# Patient Record
Sex: MALE | Race: WHITE | NOT HISPANIC OR LATINO | URBAN - METROPOLITAN AREA
[De-identification: names, ages, dates, MRNs, and addresses within clinical notes are randomized per-mention and may not be internally consistent; named-entity substitution may affect disease eponyms.]

---

## 2017-11-16 ENCOUNTER — INPATIENT (INPATIENT)
Facility: HOSPITAL | Age: 27
LOS: 2 days | Discharge: HOME | DRG: 340 | End: 2017-11-19
Attending: SURGERY | Admitting: SURGERY
Payer: COMMERCIAL

## 2017-11-16 VITALS
SYSTOLIC BLOOD PRESSURE: 117 MMHG | DIASTOLIC BLOOD PRESSURE: 41 MMHG | OXYGEN SATURATION: 96 % | HEART RATE: 53 BPM | TEMPERATURE: 97 F | RESPIRATION RATE: 18 BRPM

## 2017-11-16 LAB
ALBUMIN SERPL ELPH-MCNC: 4.3 G/DL — SIGNIFICANT CHANGE UP (ref 3.4–5)
ALP SERPL-CCNC: 56 U/L — SIGNIFICANT CHANGE UP (ref 40–120)
ALT FLD-CCNC: 25 U/L — SIGNIFICANT CHANGE UP (ref 12–42)
ANION GAP SERPL CALC-SCNC: 6 MMOL/L — LOW (ref 9–16)
APPEARANCE UR: CLEAR — SIGNIFICANT CHANGE UP
APTT BLD: 29.5 SEC — SIGNIFICANT CHANGE UP (ref 27.5–36.5)
AST SERPL-CCNC: 17 U/L — SIGNIFICANT CHANGE UP (ref 15–37)
BASOPHILS NFR BLD AUTO: 0.4 % — SIGNIFICANT CHANGE UP (ref 0–2)
BILIRUB SERPL-MCNC: 0.8 MG/DL — SIGNIFICANT CHANGE UP (ref 0.2–1.2)
BILIRUB UR-MCNC: NEGATIVE — SIGNIFICANT CHANGE UP
BUN SERPL-MCNC: 14 MG/DL — SIGNIFICANT CHANGE UP (ref 7–23)
CALCIUM SERPL-MCNC: 9.8 MG/DL — SIGNIFICANT CHANGE UP (ref 8.5–10.5)
CHLORIDE SERPL-SCNC: 102 MMOL/L — SIGNIFICANT CHANGE UP (ref 96–108)
CO2 SERPL-SCNC: 32 MMOL/L — HIGH (ref 22–31)
COLOR SPEC: YELLOW — SIGNIFICANT CHANGE UP
CREAT SERPL-MCNC: 1.13 MG/DL — SIGNIFICANT CHANGE UP (ref 0.5–1.3)
DIFF PNL FLD: NEGATIVE — SIGNIFICANT CHANGE UP
EOSINOPHIL NFR BLD AUTO: 1 % — SIGNIFICANT CHANGE UP (ref 0–6)
GLUCOSE SERPL-MCNC: 117 MG/DL — HIGH (ref 70–99)
GLUCOSE UR QL: NEGATIVE — SIGNIFICANT CHANGE UP
HCT VFR BLD CALC: 44.1 % — SIGNIFICANT CHANGE UP (ref 39–50)
HGB BLD-MCNC: 14.7 G/DL — SIGNIFICANT CHANGE UP (ref 13–17)
IMM GRANULOCYTES NFR BLD AUTO: 0.4 % — SIGNIFICANT CHANGE UP (ref 0–1.5)
INR BLD: 1.06 — SIGNIFICANT CHANGE UP (ref 0.88–1.16)
KETONES UR-MCNC: NEGATIVE — SIGNIFICANT CHANGE UP
LACTATE SERPL-SCNC: 1.7 MMOL/L — SIGNIFICANT CHANGE UP (ref 0.4–2)
LEUKOCYTE ESTERASE UR-ACNC: NEGATIVE — SIGNIFICANT CHANGE UP
LIDOCAIN IGE QN: 140 U/L — SIGNIFICANT CHANGE UP (ref 73–393)
LYMPHOCYTES # BLD AUTO: 14.3 % — SIGNIFICANT CHANGE UP (ref 13–44)
MAGNESIUM SERPL-MCNC: 1.6 MG/DL — SIGNIFICANT CHANGE UP (ref 1.6–2.6)
MCHC RBC-ENTMCNC: 30.7 PG — SIGNIFICANT CHANGE UP (ref 27–34)
MCHC RBC-ENTMCNC: 33.3 G/DL — SIGNIFICANT CHANGE UP (ref 32–36)
MCV RBC AUTO: 92.1 FL — SIGNIFICANT CHANGE UP (ref 80–100)
MONOCYTES NFR BLD AUTO: 8.9 % — SIGNIFICANT CHANGE UP (ref 2–14)
NEUTROPHILS NFR BLD AUTO: 75 % — SIGNIFICANT CHANGE UP (ref 43–77)
NITRITE UR-MCNC: NEGATIVE — SIGNIFICANT CHANGE UP
PH UR: 7 — SIGNIFICANT CHANGE UP (ref 5–8)
PLATELET # BLD AUTO: 205 K/UL — SIGNIFICANT CHANGE UP (ref 150–400)
POTASSIUM SERPL-MCNC: 4.1 MMOL/L — SIGNIFICANT CHANGE UP (ref 3.5–5.3)
POTASSIUM SERPL-SCNC: 4.1 MMOL/L — SIGNIFICANT CHANGE UP (ref 3.5–5.3)
PROT SERPL-MCNC: 7.3 G/DL — SIGNIFICANT CHANGE UP (ref 6.4–8.2)
PROT UR-MCNC: NEGATIVE MG/DL — SIGNIFICANT CHANGE UP
PROTHROM AB SERPL-ACNC: 11.7 SEC — SIGNIFICANT CHANGE UP (ref 9.8–12.7)
RBC # BLD: 4.79 M/UL — SIGNIFICANT CHANGE UP (ref 4.2–5.8)
RBC # FLD: 12.7 % — SIGNIFICANT CHANGE UP (ref 10.3–16.9)
SODIUM SERPL-SCNC: 140 MMOL/L — SIGNIFICANT CHANGE UP (ref 132–145)
SP GR SPEC: <=1.005 — SIGNIFICANT CHANGE UP (ref 1–1.03)
UROBILINOGEN FLD QL: 0.2 E.U./DL — SIGNIFICANT CHANGE UP
WBC # BLD: 11.4 K/UL — HIGH (ref 3.8–10.5)
WBC # FLD AUTO: 11.4 K/UL — HIGH (ref 3.8–10.5)

## 2017-11-16 PROCEDURE — 99285 EMERGENCY DEPT VISIT HI MDM: CPT

## 2017-11-16 PROCEDURE — 74177 CT ABD & PELVIS W/CONTRAST: CPT | Mod: 26

## 2017-11-16 RX ORDER — ONDANSETRON 8 MG/1
4 TABLET, FILM COATED ORAL ONCE
Qty: 0 | Refills: 0 | Status: COMPLETED | OUTPATIENT
Start: 2017-11-16 | End: 2017-11-16

## 2017-11-16 RX ORDER — ONDANSETRON 8 MG/1
4 TABLET, FILM COATED ORAL EVERY 6 HOURS
Qty: 0 | Refills: 0 | Status: DISCONTINUED | OUTPATIENT
Start: 2017-11-16 | End: 2017-11-19

## 2017-11-16 RX ORDER — SODIUM CHLORIDE 9 MG/ML
1000 INJECTION INTRAMUSCULAR; INTRAVENOUS; SUBCUTANEOUS ONCE
Qty: 0 | Refills: 0 | Status: COMPLETED | OUTPATIENT
Start: 2017-11-16 | End: 2017-11-16

## 2017-11-16 RX ORDER — SODIUM CHLORIDE 9 MG/ML
1000 INJECTION, SOLUTION INTRAVENOUS
Qty: 0 | Refills: 0 | Status: DISCONTINUED | OUTPATIENT
Start: 2017-11-16 | End: 2017-11-19

## 2017-11-16 RX ORDER — HYDROMORPHONE HYDROCHLORIDE 2 MG/ML
0.5 INJECTION INTRAMUSCULAR; INTRAVENOUS; SUBCUTANEOUS EVERY 4 HOURS
Qty: 0 | Refills: 0 | Status: DISCONTINUED | OUTPATIENT
Start: 2017-11-16 | End: 2017-11-19

## 2017-11-16 RX ORDER — HEPARIN SODIUM 5000 [USP'U]/ML
5000 INJECTION INTRAVENOUS; SUBCUTANEOUS EVERY 8 HOURS
Qty: 0 | Refills: 0 | Status: DISCONTINUED | OUTPATIENT
Start: 2017-11-16 | End: 2017-11-17

## 2017-11-16 RX ORDER — HYDROMORPHONE HYDROCHLORIDE 2 MG/ML
1 INJECTION INTRAMUSCULAR; INTRAVENOUS; SUBCUTANEOUS EVERY 4 HOURS
Qty: 0 | Refills: 0 | Status: DISCONTINUED | OUTPATIENT
Start: 2017-11-16 | End: 2017-11-19

## 2017-11-16 RX ORDER — IOHEXOL 300 MG/ML
50 INJECTION, SOLUTION INTRAVENOUS ONCE
Qty: 0 | Refills: 0 | Status: COMPLETED | OUTPATIENT
Start: 2017-11-16 | End: 2017-11-16

## 2017-11-16 RX ORDER — INFLUENZA VIRUS VACCINE 15; 15; 15; 15 UG/.5ML; UG/.5ML; UG/.5ML; UG/.5ML
0.5 SUSPENSION INTRAMUSCULAR ONCE
Qty: 0 | Refills: 0 | Status: DISCONTINUED | OUTPATIENT
Start: 2017-11-16 | End: 2017-11-19

## 2017-11-16 RX ORDER — PIPERACILLIN AND TAZOBACTAM 4; .5 G/20ML; G/20ML
3.38 INJECTION, POWDER, LYOPHILIZED, FOR SOLUTION INTRAVENOUS ONCE
Qty: 0 | Refills: 0 | Status: COMPLETED | OUTPATIENT
Start: 2017-11-16 | End: 2017-11-16

## 2017-11-16 RX ORDER — PIPERACILLIN AND TAZOBACTAM 4; .5 G/20ML; G/20ML
3.38 INJECTION, POWDER, LYOPHILIZED, FOR SOLUTION INTRAVENOUS EVERY 6 HOURS
Qty: 0 | Refills: 0 | Status: DISCONTINUED | OUTPATIENT
Start: 2017-11-16 | End: 2017-11-19

## 2017-11-16 RX ADMIN — IOHEXOL 50 MILLILITER(S): 300 INJECTION, SOLUTION INTRAVENOUS at 12:17

## 2017-11-16 RX ADMIN — PIPERACILLIN AND TAZOBACTAM 200 GRAM(S): 4; .5 INJECTION, POWDER, LYOPHILIZED, FOR SOLUTION INTRAVENOUS at 15:41

## 2017-11-16 RX ADMIN — ONDANSETRON 4 MILLIGRAM(S): 8 TABLET, FILM COATED ORAL at 12:17

## 2017-11-16 RX ADMIN — SODIUM CHLORIDE 1000 MILLILITER(S): 9 INJECTION INTRAMUSCULAR; INTRAVENOUS; SUBCUTANEOUS at 12:18

## 2017-11-16 RX ADMIN — HEPARIN SODIUM 5000 UNIT(S): 5000 INJECTION INTRAVENOUS; SUBCUTANEOUS at 21:01

## 2017-11-16 RX ADMIN — SODIUM CHLORIDE 1000 MILLILITER(S): 9 INJECTION INTRAMUSCULAR; INTRAVENOUS; SUBCUTANEOUS at 16:56

## 2017-11-16 NOTE — H&P ADULT - NSHPREVIEWOFSYSTEMS_GEN_ALL_CORE
REVIEW OF SYSTEMS:    CONSTITUTIONAL: No weakness, fevers or chills  EYES/ENT: No visual changes;  No vertigo or throat pain   NECK: No pain or stiffness  RESPIRATORY: No cough, wheezing, hemoptysis; No shortness of breath  CARDIOVASCULAR: No chest pain or palpitations  GASTROINTESTINAL: RLQ pain this AM. reported bouts of nausea, No vomiting, or hematemesis; No diarrhea or constipation. No melena or hematochezia.  GENITOURINARY: No dysuria, frequency or hematuria  NEUROLOGICAL: No numbness or weakness  SKIN: No itching, rashes

## 2017-11-16 NOTE — PRE-OP CHECKLIST - SELECT TESTS ORDERED
INR/CBC/BMP/PT/PTT/Hepatic Function/UCG/Urinalysis/CMP INR/Urinalysis/BMP/PT/PTT/Hepatic Function/CBC/CMP/Type and Cross/UCG/Type and Screen

## 2017-11-16 NOTE — H&P ADULT - HISTORY OF PRESENT ILLNESS
28 yo M otherwise healthy, began experiencing sudden sharp pain in his RLQ around 4 AM, fell back asleep, awoke around 7 AM with increased pain, and nausea. Was trying to get to his PCP but it took too long, he was walking down [address] when he felt he might pass out so he walked into [facility]. No vomitting, no fevers or chills, no change in BM for past few days.

## 2017-11-16 NOTE — H&P ADULT - ASSESSMENT
28 yo M with acute appendicitis    Add on for OR  NPO/IVF  Zosyn  Pain nausea control  DVT PPX  AM labs

## 2017-11-16 NOTE — ED PROVIDER NOTE - GASTROINTESTINAL, MLM
Abdomen soft,  no guarding. RLQ and RUQ tender to palpation Abdomen soft,  no guarding. RLQ tenderness to palpation

## 2017-11-16 NOTE — ED PROVIDER NOTE - PROGRESS NOTE DETAILS
The scribe's documentation has been prepared under my direction and personally reviewed by me in its entirety. I confirm that the note above accurately reflects all work, treatment, procedures, and medical decision making performed by me. acute appendicitis on CT, wbc 11, spoke with dr. ahuja ACS, accepted patient to regional bed, IV Zosyn, fluids ordered

## 2017-11-17 LAB
ANION GAP SERPL CALC-SCNC: 12 MMOL/L — SIGNIFICANT CHANGE UP (ref 5–17)
BASOPHILS NFR BLD AUTO: 0.3 % — SIGNIFICANT CHANGE UP (ref 0–2)
BUN SERPL-MCNC: 8 MG/DL — SIGNIFICANT CHANGE UP (ref 7–23)
CALCIUM SERPL-MCNC: 9.2 MG/DL — SIGNIFICANT CHANGE UP (ref 8.4–10.5)
CHLORIDE SERPL-SCNC: 98 MMOL/L — SIGNIFICANT CHANGE UP (ref 96–108)
CO2 SERPL-SCNC: 27 MMOL/L — SIGNIFICANT CHANGE UP (ref 22–31)
CREAT SERPL-MCNC: 1.13 MG/DL — SIGNIFICANT CHANGE UP (ref 0.5–1.3)
EOSINOPHIL NFR BLD AUTO: 1.8 % — SIGNIFICANT CHANGE UP (ref 0–6)
GLUCOSE SERPL-MCNC: 98 MG/DL — SIGNIFICANT CHANGE UP (ref 70–99)
HCT VFR BLD CALC: 40.9 % — SIGNIFICANT CHANGE UP (ref 39–50)
HGB BLD-MCNC: 13.8 G/DL — SIGNIFICANT CHANGE UP (ref 13–17)
LYMPHOCYTES # BLD AUTO: 28.1 % — SIGNIFICANT CHANGE UP (ref 13–44)
MCHC RBC-ENTMCNC: 31.4 PG — SIGNIFICANT CHANGE UP (ref 27–34)
MCHC RBC-ENTMCNC: 33.7 G/DL — SIGNIFICANT CHANGE UP (ref 32–36)
MCV RBC AUTO: 93 FL — SIGNIFICANT CHANGE UP (ref 80–100)
MONOCYTES NFR BLD AUTO: 12.2 % — SIGNIFICANT CHANGE UP (ref 2–14)
NEUTROPHILS NFR BLD AUTO: 57.6 % — SIGNIFICANT CHANGE UP (ref 43–77)
PLATELET # BLD AUTO: 194 K/UL — SIGNIFICANT CHANGE UP (ref 150–400)
POTASSIUM SERPL-MCNC: 4 MMOL/L — SIGNIFICANT CHANGE UP (ref 3.5–5.3)
POTASSIUM SERPL-SCNC: 4 MMOL/L — SIGNIFICANT CHANGE UP (ref 3.5–5.3)
RBC # BLD: 4.4 M/UL — SIGNIFICANT CHANGE UP (ref 4.2–5.8)
RBC # FLD: 13.3 % — SIGNIFICANT CHANGE UP (ref 10.3–16.9)
SODIUM SERPL-SCNC: 137 MMOL/L — SIGNIFICANT CHANGE UP (ref 135–145)
WBC # BLD: 7.7 K/UL — SIGNIFICANT CHANGE UP (ref 3.8–10.5)
WBC # FLD AUTO: 7.7 K/UL — SIGNIFICANT CHANGE UP (ref 3.8–10.5)

## 2017-11-17 RX ORDER — ACETAMINOPHEN 500 MG
1000 TABLET ORAL ONCE
Qty: 0 | Refills: 0 | Status: COMPLETED | OUTPATIENT
Start: 2017-11-17 | End: 2017-11-17

## 2017-11-17 RX ORDER — ACETAMINOPHEN 500 MG
750 TABLET ORAL ONCE
Qty: 0 | Refills: 0 | Status: DISCONTINUED | OUTPATIENT
Start: 2017-11-17 | End: 2017-11-17

## 2017-11-17 RX ORDER — ACETAMINOPHEN 500 MG
650 TABLET ORAL ONCE
Qty: 0 | Refills: 0 | Status: DISCONTINUED | OUTPATIENT
Start: 2017-11-17 | End: 2017-11-17

## 2017-11-17 RX ADMIN — PIPERACILLIN AND TAZOBACTAM 200 GRAM(S): 4; .5 INJECTION, POWDER, LYOPHILIZED, FOR SOLUTION INTRAVENOUS at 20:49

## 2017-11-17 RX ADMIN — HEPARIN SODIUM 5000 UNIT(S): 5000 INJECTION INTRAVENOUS; SUBCUTANEOUS at 06:34

## 2017-11-17 RX ADMIN — SODIUM CHLORIDE 125 MILLILITER(S): 9 INJECTION, SOLUTION INTRAVENOUS at 13:13

## 2017-11-17 RX ADMIN — Medication 400 MILLIGRAM(S): at 14:11

## 2017-11-17 RX ADMIN — SODIUM CHLORIDE 125 MILLILITER(S): 9 INJECTION, SOLUTION INTRAVENOUS at 06:30

## 2017-11-17 RX ADMIN — HEPARIN SODIUM 5000 UNIT(S): 5000 INJECTION INTRAVENOUS; SUBCUTANEOUS at 13:12

## 2017-11-17 RX ADMIN — HYDROMORPHONE HYDROCHLORIDE 0.5 MILLIGRAM(S): 2 INJECTION INTRAMUSCULAR; INTRAVENOUS; SUBCUTANEOUS at 17:30

## 2017-11-17 RX ADMIN — SODIUM CHLORIDE 125 MILLILITER(S): 9 INJECTION, SOLUTION INTRAVENOUS at 20:49

## 2017-11-17 RX ADMIN — PIPERACILLIN AND TAZOBACTAM 200 GRAM(S): 4; .5 INJECTION, POWDER, LYOPHILIZED, FOR SOLUTION INTRAVENOUS at 13:12

## 2017-11-17 RX ADMIN — HYDROMORPHONE HYDROCHLORIDE 0.5 MILLIGRAM(S): 2 INJECTION INTRAMUSCULAR; INTRAVENOUS; SUBCUTANEOUS at 17:28

## 2017-11-17 RX ADMIN — PIPERACILLIN AND TAZOBACTAM 200 GRAM(S): 4; .5 INJECTION, POWDER, LYOPHILIZED, FOR SOLUTION INTRAVENOUS at 01:23

## 2017-11-17 RX ADMIN — PIPERACILLIN AND TAZOBACTAM 200 GRAM(S): 4; .5 INJECTION, POWDER, LYOPHILIZED, FOR SOLUTION INTRAVENOUS at 06:30

## 2017-11-17 NOTE — PROGRESS NOTE ADULT - SUBJECTIVE AND OBJECTIVE BOX
ON: Admitted for appendicitis. Added on for OR. INTERVAL HPI/OVERNIGHT EVENTS: Pt seen and examined at bedside this morning by ACS team. Pt reporting adequate pain control.  Discussed plan for OR today for a laparoscopic appendectomy.      MEDICATIONS  (STANDING):  heparin  Injectable 5000 Unit(s) SubCutaneous every 8 hours  influenza   Vaccine 0.5 milliLiter(s) IntraMuscular once  lactated ringers. 1000 milliLiter(s) (125 mL/Hr) IV Continuous <Continuous>  piperacillin/tazobactam IVPB. 3.375 Gram(s) IV Intermittent every 6 hours    MEDICATIONS  (PRN):  HYDROmorphone  Injectable 0.5 milliGRAM(s) IV Push every 4 hours PRN Moderate Pain  HYDROmorphone  Injectable 1 milliGRAM(s) IV Push every 4 hours PRN Severe Pain  ondansetron Injectable 4 milliGRAM(s) IV Push every 6 hours PRN Nausea      Vital Signs Last 24 Hrs  T(C): 36.4 (17 Nov 2017 13:42), Max: 37 (16 Nov 2017 16:14)  T(F): 97.6 (17 Nov 2017 13:42), Max: 98.6 (16 Nov 2017 16:14)  HR: 51 (17 Nov 2017 13:42) (51 - 67)  BP: 101/66 (17 Nov 2017 13:42) (100/65 - 134/59)  BP(mean): --  RR: 16 (17 Nov 2017 13:42) (16 - 17)  SpO2: 97% (17 Nov 2017 13:42) (97% - 100%)    PHYSICAL EXAM:      Constitutional: Age appropriate male in NAD  Respiratory: non labored breathing, no respiratory distress  Cardiovascular: RRR  Gastrointestinal: Soft, tender to palpation primarily in the RLQ, without rebound/guarding/rigidity, nondistended  Extremities: WWP with no peripheral edema                  I&O's Detail    16 Nov 2017 07:01  -  17 Nov 2017 07:00  --------------------------------------------------------  IN:    IV PiggyBack: 200 mL    lactated ringers.: 1500 mL  Total IN: 1700 mL    OUT:  Total OUT: 0 mL    Total NET: 1700 mL      17 Nov 2017 07:01  -  17 Nov 2017 15:20  --------------------------------------------------------  IN:  Total IN: 0 mL    OUT:    Voided: 800 mL  Total OUT: 800 mL    Total NET: -800 mL          LABS:                        13.8   7.7   )-----------( 194      ( 17 Nov 2017 06:59 )             40.9     11-17    137  |  98  |  8   ----------------------------<  98  4.0   |  27  |  1.13    Ca    9.2      17 Nov 2017 06:59  Mg     1.6     11-16    TPro  7.3  /  Alb  4.3  /  TBili  0.8  /  DBili  x   /  AST  17  /  ALT  25  /  AlkPhos  56  11-16    PT/INR - ( 16 Nov 2017 16:47 )   PT: 11.7 sec;   INR: 1.06          PTT - ( 16 Nov 2017 16:47 )  PTT:29.5 sec  Urinalysis Basic - ( 16 Nov 2017 15:39 )    Color: Yellow / Appearance: Clear / SG: <=1.005 / pH: x  Gluc: x / Ketone: NEGATIVE  / Bili: NEGATIVE / Urobili: 0.2 E.U./dL   Blood: x / Protein: NEGATIVE mg/dL / Nitrite: NEGATIVE   Leuk Esterase: NEGATIVE / RBC: x / WBC x   Sq Epi: x / Non Sq Epi: x / Bacteria: x

## 2017-11-17 NOTE — PROGRESS NOTE ADULT - ASSESSMENT
28 y/o M with acute appendicitis now s/p laparoscopic appendectomy    -Activity as tolerated  -NPO/IVF   -IV antibiotics  -pain/nausea control  -DVT PPx/SCDs  -am labs

## 2017-11-17 NOTE — PROGRESS NOTE ADULT - ASSESSMENT
28 yo M with acute appendicitis    Add on for OR  NPO/IVF  Zosyn  Pain nausea control  DVT PPX  AM labs 26 yo M with acute appendicitis    Added on for laparoscopic appendectomy today  NPO/IVF  Zosyn  Pain nausea control  DVT PPX, Encourage ambulation  AM labs

## 2017-11-17 NOTE — PROGRESS NOTE ADULT - SUBJECTIVE AND OBJECTIVE BOX
Procedure: Laparoscopic Appendectoy  Surgeon: Sherri    S: Pt c/o mild pain at incisions Denies CP, SOB, ACEVEDO, calf tenderness. Pain controlled with medication/ice packs    O:  T(C): 36.8 (11-17-17 @ 18:49), Max: 36.8 (11-17-17 @ 18:49)  T(F): 98.2 (11-17-17 @ 18:49), Max: 98.2 (11-17-17 @ 18:49)  HR: 66 (11-17-17 @ 18:49) (66 - 87)  BP: 112/51 (11-17-17 @ 18:49) (112/51 - 151/62)  RR: 18 (11-17-17 @ 18:49) (10 - 23)  SpO2: 100% (11-17-17 @ 18:49) (99% - 100%)  Wt(kg): --                        13.8   7.7   )-----------( 194      ( 17 Nov 2017 06:59 )             40.9     11-17    137  |  98  |  8   ----------------------------<  98  4.0   |  27  |  1.13    Ca    9.2      17 Nov 2017 06:59  Mg     1.6     11-16    TPro  7.3  /  Alb  4.3  /  TBili  0.8  /  DBili  x   /  AST  17  /  ALT  25  /  AlkPhos  56  11-16      Gen: NAD, resting comfortably in bed  C/V: NSR  Pulm: Nonlabored breathing, no respiratory distress  Abd: soft, NT/ND Incision: CDI  Extrem: WWP, no calf edema, SCDs in place      A/P: 27yMale s/p above procedure  Diet:  IVF:  Pain/nausea control  DVT ppx:  Dispo plan:

## 2017-11-17 NOTE — BRIEF OPERATIVE NOTE - PROCEDURE
<<-----Click on this checkbox to enter Procedure Laparoscopic appendectomy  11/17/2017    Active  [place]

## 2017-11-18 LAB
ANION GAP SERPL CALC-SCNC: 13 MMOL/L — SIGNIFICANT CHANGE UP (ref 5–17)
BUN SERPL-MCNC: 7 MG/DL — SIGNIFICANT CHANGE UP (ref 7–23)
CALCIUM SERPL-MCNC: 9.8 MG/DL — SIGNIFICANT CHANGE UP (ref 8.4–10.5)
CHLORIDE SERPL-SCNC: 98 MMOL/L — SIGNIFICANT CHANGE UP (ref 96–108)
CO2 SERPL-SCNC: 26 MMOL/L — SIGNIFICANT CHANGE UP (ref 22–31)
CREAT SERPL-MCNC: 0.99 MG/DL — SIGNIFICANT CHANGE UP (ref 0.5–1.3)
GLUCOSE SERPL-MCNC: 113 MG/DL — HIGH (ref 70–99)
HCT VFR BLD CALC: 42.7 % — SIGNIFICANT CHANGE UP (ref 39–50)
HGB BLD-MCNC: 14.1 G/DL — SIGNIFICANT CHANGE UP (ref 13–17)
MAGNESIUM SERPL-MCNC: 2 MG/DL — SIGNIFICANT CHANGE UP (ref 1.6–2.6)
MCHC RBC-ENTMCNC: 30.4 PG — SIGNIFICANT CHANGE UP (ref 27–34)
MCHC RBC-ENTMCNC: 33 G/DL — SIGNIFICANT CHANGE UP (ref 32–36)
MCV RBC AUTO: 92 FL — SIGNIFICANT CHANGE UP (ref 80–100)
PHOSPHATE SERPL-MCNC: 4.5 MG/DL — SIGNIFICANT CHANGE UP (ref 2.5–4.5)
PLATELET # BLD AUTO: 220 K/UL — SIGNIFICANT CHANGE UP (ref 150–400)
POTASSIUM SERPL-MCNC: 4 MMOL/L — SIGNIFICANT CHANGE UP (ref 3.5–5.3)
POTASSIUM SERPL-SCNC: 4 MMOL/L — SIGNIFICANT CHANGE UP (ref 3.5–5.3)
RBC # BLD: 4.64 M/UL — SIGNIFICANT CHANGE UP (ref 4.2–5.8)
RBC # FLD: 12.7 % — SIGNIFICANT CHANGE UP (ref 10.3–16.9)
SODIUM SERPL-SCNC: 137 MMOL/L — SIGNIFICANT CHANGE UP (ref 135–145)
WBC # BLD: 10 K/UL — SIGNIFICANT CHANGE UP (ref 3.8–10.5)
WBC # FLD AUTO: 10 K/UL — SIGNIFICANT CHANGE UP (ref 3.8–10.5)

## 2017-11-18 RX ADMIN — SODIUM CHLORIDE 125 MILLILITER(S): 9 INJECTION, SOLUTION INTRAVENOUS at 02:53

## 2017-11-18 RX ADMIN — HYDROMORPHONE HYDROCHLORIDE 0.5 MILLIGRAM(S): 2 INJECTION INTRAMUSCULAR; INTRAVENOUS; SUBCUTANEOUS at 06:25

## 2017-11-18 RX ADMIN — HYDROMORPHONE HYDROCHLORIDE 0.5 MILLIGRAM(S): 2 INJECTION INTRAMUSCULAR; INTRAVENOUS; SUBCUTANEOUS at 06:45

## 2017-11-18 RX ADMIN — HYDROMORPHONE HYDROCHLORIDE 0.5 MILLIGRAM(S): 2 INJECTION INTRAMUSCULAR; INTRAVENOUS; SUBCUTANEOUS at 17:18

## 2017-11-18 RX ADMIN — PIPERACILLIN AND TAZOBACTAM 200 GRAM(S): 4; .5 INJECTION, POWDER, LYOPHILIZED, FOR SOLUTION INTRAVENOUS at 08:43

## 2017-11-18 RX ADMIN — HYDROMORPHONE HYDROCHLORIDE 0.5 MILLIGRAM(S): 2 INJECTION INTRAMUSCULAR; INTRAVENOUS; SUBCUTANEOUS at 17:35

## 2017-11-18 RX ADMIN — PIPERACILLIN AND TAZOBACTAM 200 GRAM(S): 4; .5 INJECTION, POWDER, LYOPHILIZED, FOR SOLUTION INTRAVENOUS at 14:48

## 2017-11-18 RX ADMIN — PIPERACILLIN AND TAZOBACTAM 200 GRAM(S): 4; .5 INJECTION, POWDER, LYOPHILIZED, FOR SOLUTION INTRAVENOUS at 02:53

## 2017-11-18 RX ADMIN — PIPERACILLIN AND TAZOBACTAM 200 GRAM(S): 4; .5 INJECTION, POWDER, LYOPHILIZED, FOR SOLUTION INTRAVENOUS at 20:45

## 2017-11-18 RX ADMIN — SODIUM CHLORIDE 125 MILLILITER(S): 9 INJECTION, SOLUTION INTRAVENOUS at 20:45

## 2017-11-18 NOTE — PROGRESS NOTE ADULT - SUBJECTIVE AND OBJECTIVE BOX
INTERVAL HPI/OVERNIGHT EVENTS:   SURGERY ATTENDING    STATUS POST:  LAPAROSCOPIC APPENDECTOMY    POST OPERATIVE DAY #: 1    SUBJECTIVE:  Flatus: [X ] YES [ ] NO             Bowel Movement: [ ] YES [X ] NO  Pain (0-10):      2      Pain Control Adequate: [X ] YES [ ] NO  Nausea: [ ] YES [X ] NO            Vomiting: [ ] YES [X ] NO  Diarrhea: [ ] YES [X ] NO         Constipation: [ ] YES [X ] NO     Chest Pain: [ ] YES [ X] NO    SOB:  [ ] YES [X ] NO    MEDICATIONS  (STANDING):  influenza   Vaccine 0.5 milliLiter(s) IntraMuscular once  lactated ringers. 1000 milliLiter(s) (125 mL/Hr) IV Continuous <Continuous>  piperacillin/tazobactam IVPB. 3.375 Gram(s) IV Intermittent every 6 hours    MEDICATIONS  (PRN):  HYDROmorphone  Injectable 0.5 milliGRAM(s) IV Push every 4 hours PRN Moderate Pain  HYDROmorphone  Injectable 1 milliGRAM(s) IV Push every 4 hours PRN Severe Pain  ondansetron Injectable 4 milliGRAM(s) IV Push every 6 hours PRN Nausea      Vital Signs Last 24 Hrs  T(C): 36.7 (18 Nov 2017 17:11), Max: 36.8 (17 Nov 2017 18:49)  T(F): 98.1 (18 Nov 2017 17:11), Max: 98.2 (17 Nov 2017 18:49)  HR: 70 (18 Nov 2017 17:11) (66 - 93)  BP: 125/78 (18 Nov 2017 17:11) (112/51 - 149/82)  BP(mean): 75 (17 Nov 2017 18:49) (75 - 86)  RR: 17 (18 Nov 2017 17:11) (14 - 23)  SpO2: 95% (18 Nov 2017 17:11) (95% - 100%)    PHYSICAL EXAM:      Constitutional:    Eyes:    ENMT:    Neck:    Breasts:    Back:    Respiratory:    Cardiovascular:    Gastrointestinal:    Genitourinary:    Rectal:    Extremities:    Vascular:    Neurological:    Skin:    Lymph Nodes:    Musculoskeletal:    Psychiatric:        I&O's Detail    17 Nov 2017 07:01  -  18 Nov 2017 07:00  --------------------------------------------------------  IN:    IV PiggyBack: 200 mL    lactated ringers.: 1875 mL    Oral Fluid: 300 mL  Total IN: 2375 mL    OUT:    Voided: 1800 mL  Total OUT: 1800 mL    Total NET: 575 mL      18 Nov 2017 07:01  -  18 Nov 2017 17:36  --------------------------------------------------------  IN:    IV PiggyBack: 100 mL    lactated ringers.: 1000 mL    Oral Fluid: 720 mL  Total IN: 1820 mL    OUT:    Voided: 1150 mL  Total OUT: 1150 mL    Total NET: 670 mL          LABS:                        14.1   10.0  )-----------( 220      ( 18 Nov 2017 07:09 )             42.7     11-18    137  |  98  |  7   ----------------------------<  113<H>  4.0   |  26  |  0.99    Ca    9.8      18 Nov 2017 07:09  Phos  4.5     11-18  Mg     2.0     11-18            RADIOLOGY & ADDITIONAL STUDIES:

## 2017-11-18 NOTE — DISCHARGE NOTE ADULT - HOSPITAL COURSE
27 year old male s/p laparoscopic appendectomy for perforated appendicitis. Patient doing well postoperatively. Patient sent home on augmentin and vicodin, Patient should follow instructions listed above. Mr. Olszewski was a 27 year old male s/p laparoscopic appendectomy for perforated appendicitis. Patient recovered well postoperatively with return of bowel function. He was discharged in stable condition and vitals within normal limits.

## 2017-11-18 NOTE — CONSULT NOTE ADULT - ASSESSMENT
Acute appendicitis  S/P Lap appendectomy      RECOMMEND  PO intake and advance diet  If able to tolerate PO intake, would change IV abx to PO Augmentin 875 mg twice a day for 7-10 days

## 2017-11-18 NOTE — PROGRESS NOTE ADULT - ATTENDING COMMENTS
ABDOMEN SOFT, N/T, N/D, BS+, FLATUS+, BM-, TOLERATING CLEARS.  ID CONSULT.  D/C HOME ON PO ABX WITH F/U IN THE OFFICE.

## 2017-11-18 NOTE — PROGRESS NOTE ADULT - SUBJECTIVE AND OBJECTIVE BOX
INTERVAL HPI/OVERNIGHT EVENTS: No acute events, passed TOV    STATUS POST:  Laparoscopic appendectomy    POST OPERATIVE DAY #: 1    SUBJECTIVE:  Flatus: [ ] YES [ ] NO             Bowel Movement: [ ] YES [ ] NO  Pain (0-10):            Pain Control Adequate: [ ] YES [ ] NO  Nausea: [ ] YES [ ] NO            Vomiting: [ ] YES [ ] NO  Diarrhea: [ ] YES [ ] NO         Constipation: [ ] YES [ ] NO     Chest Pain: [ ] YES [ ] NO    SOB:  [ ] YES [ ] NO    MEDICATIONS  (STANDING):  influenza   Vaccine 0.5 milliLiter(s) IntraMuscular once  lactated ringers. 1000 milliLiter(s) (125 mL/Hr) IV Continuous <Continuous>  piperacillin/tazobactam IVPB. 3.375 Gram(s) IV Intermittent every 6 hours    MEDICATIONS  (PRN):  HYDROmorphone  Injectable 0.5 milliGRAM(s) IV Push every 4 hours PRN Moderate Pain  HYDROmorphone  Injectable 1 milliGRAM(s) IV Push every 4 hours PRN Severe Pain  ondansetron Injectable 4 milliGRAM(s) IV Push every 6 hours PRN Nausea      Vital Signs Last 24 Hrs  T(C): 36.4 (18 Nov 2017 00:20), Max: 36.8 (17 Nov 2017 18:49)  T(F): 97.6 (18 Nov 2017 00:20), Max: 98.2 (17 Nov 2017 18:49)  HR: 72 (18 Nov 2017 00:20) (51 - 93)  BP: 113/61 (18 Nov 2017 00:20) (100/65 - 151/62)  BP(mean): 75 (17 Nov 2017 18:49) (75 - 90)  RR: 17 (18 Nov 2017 00:20) (10 - 23)  SpO2: 97% (18 Nov 2017 00:20) (97% - 100%)    PHYSICAL EXAM:      Constitutional: A&Ox3    Breasts:    Respiratory: non labored breathing, no respiratory distress    Cardiovascular: NSR, RRR    Gastrointestinal:                 Incision:    Genitourinary:    Extremities: (-) edema                  I&O's Detail    16 Nov 2017 07:01  -  17 Nov 2017 07:00  --------------------------------------------------------  IN:    IV PiggyBack: 200 mL    lactated ringers.: 1500 mL  Total IN: 1700 mL    OUT:  Total OUT: 0 mL    Total NET: 1700 mL      17 Nov 2017 07:01  -  18 Nov 2017 04:33  --------------------------------------------------------  IN:    IV PiggyBack: 200 mL    lactated ringers.: 1250 mL    Oral Fluid: 300 mL  Total IN: 1750 mL    OUT:    Voided: 1400 mL  Total OUT: 1400 mL    Total NET: 350 mL          LABS:                        13.8   7.7   )-----------( 194      ( 17 Nov 2017 06:59 )             40.9     11-17    137  |  98  |  8   ----------------------------<  98  4.0   |  27  |  1.13    Ca    9.2      17 Nov 2017 06:59  Mg     1.6     11-16    TPro  7.3  /  Alb  4.3  /  TBili  0.8  /  DBili  x   /  AST  17  /  ALT  25  /  AlkPhos  56  11-16    PT/INR - ( 16 Nov 2017 16:47 )   PT: 11.7 sec;   INR: 1.06          PTT - ( 16 Nov 2017 16:47 )  PTT:29.5 sec  Urinalysis Basic - ( 16 Nov 2017 15:39 )    Color: Yellow / Appearance: Clear / SG: <=1.005 / pH: x  Gluc: x / Ketone: NEGATIVE  / Bili: NEGATIVE / Urobili: 0.2 E.U./dL   Blood: x / Protein: NEGATIVE mg/dL / Nitrite: NEGATIVE   Leuk Esterase: NEGATIVE / RBC: x / WBC x   Sq Epi: x / Non Sq Epi: x / Bacteria: x        RADIOLOGY & ADDITIONAL STUDIES: INTERVAL HPI/OVERNIGHT EVENTS: No acute events, passed TOV    STATUS POST:  Laparoscopic appendectomy  Patient doing well  Resting comfortably in bed     POST OPERATIVE DAY #: 1    Vital Signs Last 24 Hrs  T(C): 36.8 (18 Nov 2017 05:44), Max: 36.8 (17 Nov 2017 18:49)  T(F): 98.2 (18 Nov 2017 05:44), Max: 98.2 (17 Nov 2017 18:49)  HR: 67 (18 Nov 2017 05:44) (51 - 93)  BP: 134/72 (18 Nov 2017 05:44) (101/66 - 151/62)  BP(mean): 75 (17 Nov 2017 18:49) (75 - 90)  RR: 16 (18 Nov 2017 05:44) (10 - 23)  SpO2: 97% (18 Nov 2017 05:44) (97% - 100%)    I&O's Summary    17 Nov 2017 07:01  -  18 Nov 2017 07:00  --------------------------------------------------------  IN: 2375 mL / OUT: 1800 mL / NET: 575 mL        Gen: NAD   Abd: soft, ATTP / ND   incisions c/d/i

## 2017-11-18 NOTE — DISCHARGE NOTE ADULT - PATIENT PORTAL LINK FT
“You can access the [employer] Patient Portal, offered by [facility], by registering with the following website: [email or web address]”

## 2017-11-18 NOTE — CONSULT NOTE ADULT - SUBJECTIVE AND OBJECTIVE BOX
HPI:  28 yo M otherwise healthy, began experiencing sudden sharp pain in his RLQ around 4 AM, fell back asleep, awoke around 7 AM with increased pain, and nausea. Was trying to get to his PCP but it took too long, he was walking down [address] when he felt he might pass out so he walked into [facility]. No vomitting, no fevers or chills, no change in BM for past few days. (16 Nov 2017 18:35)    STATUS POST:  LAPAROSCOPIC APPENDECTOMY  POST OPERATIVE DAY # 1    FELLING BETTER  FLATUS PRESENT  STARTED CLEARS      PAST MEDICAL & SURGICAL HISTORY:  No pertinent past medical history  No significant past surgical history      REVIEW OF SYSTEMS  Otherwise negative        MEDICATIONS  (STANDING):  influenza   Vaccine 0.5 milliLiter(s) IntraMuscular once  lactated ringers. 1000 milliLiter(s) (125 mL/Hr) IV Continuous <Continuous>  piperacillin/tazobactam IVPB. 3.375 Gram(s) IV Intermittent every 6 hours    MEDICATIONS  (PRN):  HYDROmorphone  Injectable 0.5 milliGRAM(s) IV Push every 4 hours PRN Moderate Pain  HYDROmorphone  Injectable 1 milliGRAM(s) IV Push every 4 hours PRN Severe Pain  ondansetron Injectable 4 milliGRAM(s) IV Push every 6 hours PRN Nausea      Allergies    No Known Allergies    SOCIAL HISTORY:  Lives at home  No IDU    FAMILY HISTORY:  No pertinent family history in first degree relatives    EXAM  Vital Signs Last 24 Hrs  T(C): 36.7 (18 Nov 2017 17:11), Max: 36.8 (18 Nov 2017 05:44)  T(F): 98.1 (18 Nov 2017 17:11), Max: 98.2 (18 Nov 2017 05:44)  HR: 70 (18 Nov 2017 17:11) (67 - 93)  BP: 125/78 (18 Nov 2017 17:11) (113/61 - 149/82)  BP(mean): --  RR: 17 (18 Nov 2017 17:11) (16 - 18)  SpO2: 95% (18 Nov 2017 17:11) (95% - 100%)  On RA   Awake and alert  No rash  No oral lesions  RRR  Chest CTA  Abd softly distended and mildly tender  Periumbilical ecchymosis. no erythema      LABS:                        14.1   10.0  )-----------( 220      ( 18 Nov 2017 07:09 )             42.7     11-18    137  |  98  |  7   ----------------------------<  113<H>  4.0   |  26  |  0.99    Ca    9.8      18 Nov 2017 07:09  Phos  4.5     11-18  Mg     2.0     11-18      OR cultures pending      RADIOLOGY & ADDITIONAL STUDIES:    < from: CT Abdomen and Pelvis w/ Oral Cont and w/ IV Cont (11.16.17 @ 14:35) >    EXAM:  CT ABDOMEN AND PELVIS OC IC                           PROCEDURE DATE:  11/16/2017    Quantity of Contrast in Vial in ml: 100 Contrast Used: Omnipaque 350  Quantity of Contrast Wasted in ml: 4           INTERPRETATION:  CT SCAN OF ABDOMEN ANDPELVIS    HISTORY: Right lower quadrant abdominal pain.    TECHNIQUE: CT scan of abdomen and pelvis was performed from lung bases   through symphysis pubis. Intravenous and oral contrast material were   utilized. Axial, sagittal and coronal reformatted images were reviewed.    PRIOR STUDIES: None    FINDINGS: The lung bases are clear. The liver is normal in size and   configuration. The gallbladder is unremarkable. The spleen and pancreas   are within normal limits. No adrenal lesion is seen. Thekidneys are   unremarkable. There is no hydronephrosis. There is no bowel obstruction.   The appendix is fluid-filled and dilated with mild surrounding fat   stranding, compatible with acute appendicitis. The appendix measures up   to 10 mm in diameter. There is no evidence of perforation. There is no   pelvic free fluid. There is no worrisome lytic or blastic osseous lesion.    IMPRESSION: Acute appendicitis.

## 2017-11-18 NOTE — DISCHARGE NOTE ADULT - CARE PROVIDER_API CALL
Laura Polanco (MD), Surgery  [address], [place] [address]  Phone: [phone]  Fax: [phone]

## 2017-11-18 NOTE — PROGRESS NOTE ADULT - ASSESSMENT
28 yo M with acute appendicitis s/p laparoscopic appendectomy     1)Appendicitis s/p above procedure  -CLD  -IVF until tolerated diet  -Zosyn  -Pain nausea control  -DVT PPX  -AM labs 26 yo M with acute appendicitis s/p laparoscopic appendectomy  -CLD  -IVF  -Zosyn  -Pain nausea control  -DVT PPX  -AM labs

## 2017-11-18 NOTE — DISCHARGE NOTE ADULT - MEDICATION SUMMARY - MEDICATIONS TO TAKE
I will START or STAY ON the medications listed below when I get home from the hospital:    Vicodin 5 mg-300 mg oral tablet  -- 1 tab(s) by mouth every 6 hours, As Needed -for severe pain MDD:4   -- Caution federal law prohibits the transfer of this drug to any person other  than the person for whom it was prescribed.  Do not drink alcoholic beverages when taking this medication.  May cause drowsiness.  Alcohol may intensify this effect.  Use care when operating dangerous machinery.  This drug may impair the ability to drive or operate machinery.  Use care until you become familiar with its effects.  This product contains acetaminophen.  Do not use  with any other product containing acetaminophen to prevent possible liver damage.  Using more of this medication than prescribed may cause serious breathing problems.    -- Indication: For for severe pain    amoxicillin-clavulanate 875 mg-125 mg oral tablet  -- 1 tab(s) by mouth every 12 hours MDD:2  -- Finish all this medication unless otherwise directed by prescriber.  Take with food or milk.    -- Indication: For Antibiotics

## 2017-11-18 NOTE — DISCHARGE NOTE ADULT - PLAN OF CARE
s/p laparoscopic appendectomy follow up in the office with dr. ahuja. call to schedule an appointment in the office in 1 - 2 weeks. call sooner if anything changes i.e. increased pain or fevers. patient should have bed rest for 4 days, have ice packs all the time, at least 2 liters of water in 24 hours. The patient should take tylenol and 1 advil every 6 hours standing. Liquid diet should continue until bowel movement. Instructions Follow up in the office with Dr. Polanco. Please call [phone]. Call to schedule an appointment in the office in 1 - 2 weeks. Call sooner if anything changes i.e. increased pain or fevers. patient should have bed rest for 4 days, have ice packs all the time, at least 2 liters of water in 24 hours. The patient should take tylenol and 1 advil every 6 hours standing. Liquid diet should continue until bowel movement. Take vicodin (1 tab every 6 hours) for severe 7-10 pain. Please follow up with Dr. Polanco next week. Call his office to schedule an appointment: [phone].    Contact your doctor or go to the ER for fever > 101.5, chills, nausea, vomiting, chest pain, shortness of breath, pain not controlled by medication or excessive bleeding. Antibiotics You have been prescribed oral antibiotics. Please be sure to complete the entire course as directed.

## 2017-11-18 NOTE — DISCHARGE NOTE ADULT - CARE PLAN
Principal Discharge DX:	Appendicitis  Goal:	s/p laparoscopic appendectomy  Instructions for follow-up, activity and diet:	follow up in the office with dr. ahuja. call to schedule an appointment in the office in 1 - 2 weeks. call sooner if anything changes i.e. increased pain or fevers. patient should have bed rest for 4 days, have ice packs all the time, at least 2 liters of water in 24 hours. The patient should take tylenol and 1 advil every 6 hours standing. Liquid diet should continue until bowel movement. Principal Discharge DX:	Appendicitis  Goal:	Instructions  Instructions for follow-up, activity and diet:	Follow up in the office with Dr. Polanco. Please call [phone]. Call to schedule an appointment in the office in 1 - 2 weeks. Call sooner if anything changes i.e. increased pain or fevers. patient should have bed rest for 4 days, have ice packs all the time, at least 2 liters of water in 24 hours. The patient should take tylenol and 1 advil every 6 hours standing. Liquid diet should continue until bowel movement. Take vicodin (1 tab every 6 hours) for severe 7-10 pain.  Instructions for follow-up, activity and diet:	Please follow up with Dr. Polanco next week. Call his office to schedule an appointment: [phone].    Contact your doctor or go to the ER for fever > 101.5, chills, nausea, vomiting, chest pain, shortness of breath, pain not controlled by medication or excessive bleeding.  Goal:	Antibiotics  Instructions for follow-up, activity and diet:	You have been prescribed oral antibiotics. Please be sure to complete the entire course as directed.

## 2017-11-19 VITALS
SYSTOLIC BLOOD PRESSURE: 118 MMHG | OXYGEN SATURATION: 99 % | RESPIRATION RATE: 16 BRPM | DIASTOLIC BLOOD PRESSURE: 67 MMHG | HEART RATE: 77 BPM | TEMPERATURE: 98 F

## 2017-11-19 LAB
ANION GAP SERPL CALC-SCNC: 9 MMOL/L — SIGNIFICANT CHANGE UP (ref 5–17)
BUN SERPL-MCNC: 7 MG/DL — SIGNIFICANT CHANGE UP (ref 7–23)
CALCIUM SERPL-MCNC: 9.5 MG/DL — SIGNIFICANT CHANGE UP (ref 8.4–10.5)
CHLORIDE SERPL-SCNC: 100 MMOL/L — SIGNIFICANT CHANGE UP (ref 96–108)
CO2 SERPL-SCNC: 29 MMOL/L — SIGNIFICANT CHANGE UP (ref 22–31)
CREAT SERPL-MCNC: 1.17 MG/DL — SIGNIFICANT CHANGE UP (ref 0.5–1.3)
GLUCOSE SERPL-MCNC: 93 MG/DL — SIGNIFICANT CHANGE UP (ref 70–99)
HCT VFR BLD CALC: 41.2 % — SIGNIFICANT CHANGE UP (ref 39–50)
HGB BLD-MCNC: 13.2 G/DL — SIGNIFICANT CHANGE UP (ref 13–17)
MAGNESIUM SERPL-MCNC: 1.9 MG/DL — SIGNIFICANT CHANGE UP (ref 1.6–2.6)
MCHC RBC-ENTMCNC: 30.3 PG — SIGNIFICANT CHANGE UP (ref 27–34)
MCHC RBC-ENTMCNC: 32 G/DL — SIGNIFICANT CHANGE UP (ref 32–36)
MCV RBC AUTO: 94.5 FL — SIGNIFICANT CHANGE UP (ref 80–100)
PHOSPHATE SERPL-MCNC: 3.1 MG/DL — SIGNIFICANT CHANGE UP (ref 2.5–4.5)
PLATELET # BLD AUTO: 206 K/UL — SIGNIFICANT CHANGE UP (ref 150–400)
POTASSIUM SERPL-MCNC: 4.3 MMOL/L — SIGNIFICANT CHANGE UP (ref 3.5–5.3)
POTASSIUM SERPL-SCNC: 4.3 MMOL/L — SIGNIFICANT CHANGE UP (ref 3.5–5.3)
RBC # BLD: 4.36 M/UL — SIGNIFICANT CHANGE UP (ref 4.2–5.8)
RBC # FLD: 13.3 % — SIGNIFICANT CHANGE UP (ref 10.3–16.9)
SODIUM SERPL-SCNC: 138 MMOL/L — SIGNIFICANT CHANGE UP (ref 135–145)
WBC # BLD: 6.9 K/UL — SIGNIFICANT CHANGE UP (ref 3.8–10.5)
WBC # FLD AUTO: 6.9 K/UL — SIGNIFICANT CHANGE UP (ref 3.8–10.5)

## 2017-11-19 RX ORDER — MAGNESIUM SULFATE 500 MG/ML
2 VIAL (ML) INJECTION ONCE
Qty: 0 | Refills: 0 | Status: COMPLETED | OUTPATIENT
Start: 2017-11-19 | End: 2017-11-19

## 2017-11-19 RX ADMIN — PIPERACILLIN AND TAZOBACTAM 200 GRAM(S): 4; .5 INJECTION, POWDER, LYOPHILIZED, FOR SOLUTION INTRAVENOUS at 08:32

## 2017-11-19 RX ADMIN — Medication 50 GRAM(S): at 10:03

## 2017-11-19 RX ADMIN — PIPERACILLIN AND TAZOBACTAM 200 GRAM(S): 4; .5 INJECTION, POWDER, LYOPHILIZED, FOR SOLUTION INTRAVENOUS at 03:22

## 2017-11-19 NOTE — PROGRESS NOTE ADULT - SUBJECTIVE AND OBJECTIVE BOX
INTERVAL HPI/OVERNIGHT EVENTS:       SURGERY ATTENDING    STATUS POST:  LAPAROSCOPIC APPENDECTOMY    POST OPERATIVE DAY #: 2    SUBJECTIVE:  Flatus: [X ] YES [ ] NO             Bowel Movement: [ ] YES [X ] NO  Pain (0-10):        2    Pain Control Adequate: [X ] YES [ ] NO  Nausea: [ ] YES [X ] NO            Vomiting: [ ] YES [X ] NO  Diarrhea: [ ] YES [X ] NO         Constipation: [ ] YES [X ] NO     Chest Pain: [ ] YES [X ] NO    SOB:  [ ] YES [X ] NO    MEDICATIONS  (STANDING):  influenza   Vaccine 0.5 milliLiter(s) IntraMuscular once  lactated ringers. 1000 milliLiter(s) (125 mL/Hr) IV Continuous <Continuous>  piperacillin/tazobactam IVPB. 3.375 Gram(s) IV Intermittent every 6 hours    MEDICATIONS  (PRN):  HYDROmorphone  Injectable 0.5 milliGRAM(s) IV Push every 4 hours PRN Moderate Pain  HYDROmorphone  Injectable 1 milliGRAM(s) IV Push every 4 hours PRN Severe Pain  ondansetron Injectable 4 milliGRAM(s) IV Push every 6 hours PRN Nausea      Vital Signs Last 24 Hrs  T(C): 36.7 (19 Nov 2017 08:30), Max: 36.7 (18 Nov 2017 17:11)  T(F): 98 (19 Nov 2017 08:30), Max: 98.1 (18 Nov 2017 17:11)  HR: 77 (19 Nov 2017 08:30) (61 - 77)  BP: 118/67 (19 Nov 2017 08:30) (105/69 - 125/78)  BP(mean): --  RR: 16 (19 Nov 2017 08:30) (16 - 17)  SpO2: 99% (19 Nov 2017 08:30) (95% - 99%)    PHYSICAL EXAM:      Constitutional:    Eyes:    ENMT:    Neck:    Breasts:    Back:    Respiratory:    Cardiovascular:    Gastrointestinal:    Genitourinary:    Rectal:    Extremities:    Vascular:    Neurological:    Skin:    Lymph Nodes:    Musculoskeletal:    Psychiatric:        I&O's Detail    18 Nov 2017 07:01  -  19 Nov 2017 07:00  --------------------------------------------------------  IN:    IV PiggyBack: 400 mL    lactated ringers.: 2750 mL    Oral Fluid: 720 mL  Total IN: 3870 mL    OUT:    Voided: 2050 mL  Total OUT: 2050 mL    Total NET: 1820 mL      19 Nov 2017 07:01  -  19 Nov 2017 14:05  --------------------------------------------------------  IN:    IV PiggyBack: 200 mL    lactated ringers.: 375 mL    Oral Fluid: 740 mL  Total IN: 1315 mL    OUT:  Total OUT: 0 mL    Total NET: 1315 mL          LABS:                        13.2   6.9   )-----------( 206      ( 19 Nov 2017 07:31 )             41.2     11-19    138  |  100  |  7   ----------------------------<  93  4.3   |  29  |  1.17    Ca    9.5      19 Nov 2017 07:31  Phos  3.1     11-19  Mg     1.9     11-19            RADIOLOGY & ADDITIONAL STUDIES:

## 2017-11-19 NOTE — PROGRESS NOTE ADULT - SUBJECTIVE AND OBJECTIVE BOX
STATUS POST:  11/17 - laparoscopic appendectomy, NPNG, some murky fluid in pelvis.      SUBJECTIVE: Patient seen and examined bedside by chief resident. Tolerating diet. Pt reports BM and flatus. Vitals stable OVN.    piperacillin/tazobactam IVPB. 3.375 Gram(s) IV Intermittent every 6 hours      Vital Signs Last 24 Hrs  T(C): 36.4 (19 Nov 2017 05:27), Max: 36.7 (18 Nov 2017 17:11)  T(F): 97.5 (19 Nov 2017 05:27), Max: 98.1 (18 Nov 2017 17:11)  HR: 61 (19 Nov 2017 05:27) (61 - 89)  BP: 105/69 (19 Nov 2017 05:27) (105/69 - 149/82)  BP(mean): --  RR: 17 (19 Nov 2017 05:27) (16 - 18)  SpO2: 97% (19 Nov 2017 05:27) (95% - 100%)  I&O's Detail    18 Nov 2017 07:01  -  19 Nov 2017 07:00  --------------------------------------------------------  IN:    IV PiggyBack: 400 mL    lactated ringers.: 2750 mL    Oral Fluid: 720 mL  Total IN: 3870 mL    OUT:    Voided: 2050 mL  Total OUT: 2050 mL    Total NET: 1820 mL          General: NAD, resting comfortably in bed  C/V: NSR  Pulm: Nonlabored breathing, no respiratory distress  Abd: soft, ND, appropriate incisional tenderness  Extrem: WWP, no edema, SCDs in place        LABS:                        14.1   10.0  )-----------( 220      ( 18 Nov 2017 07:09 )             42.7     11-19    138  |  100  |  7   ----------------------------<  93  4.3   |  29  |  1.17    Ca    9.5      19 Nov 2017 07:31  Phos  3.1     11-19  Mg     1.9     11-19            RADIOLOGY & ADDITIONAL STUDIES:

## 2017-11-20 PROCEDURE — 86850 RBC ANTIBODY SCREEN: CPT

## 2017-11-20 PROCEDURE — 80048 BASIC METABOLIC PNL TOTAL CA: CPT

## 2017-11-20 PROCEDURE — 84100 ASSAY OF PHOSPHORUS: CPT

## 2017-11-20 PROCEDURE — 99285 EMERGENCY DEPT VISIT HI MDM: CPT | Mod: 25

## 2017-11-20 PROCEDURE — 83690 ASSAY OF LIPASE: CPT

## 2017-11-20 PROCEDURE — 85730 THROMBOPLASTIN TIME PARTIAL: CPT

## 2017-11-20 PROCEDURE — 85025 COMPLETE CBC W/AUTO DIFF WBC: CPT

## 2017-11-20 PROCEDURE — 85027 COMPLETE CBC AUTOMATED: CPT

## 2017-11-20 PROCEDURE — 81003 URINALYSIS AUTO W/O SCOPE: CPT

## 2017-11-20 PROCEDURE — 85610 PROTHROMBIN TIME: CPT

## 2017-11-20 PROCEDURE — 36415 COLL VENOUS BLD VENIPUNCTURE: CPT

## 2017-11-20 PROCEDURE — 96375 TX/PRO/DX INJ NEW DRUG ADDON: CPT

## 2017-11-20 PROCEDURE — 96374 THER/PROPH/DIAG INJ IV PUSH: CPT | Mod: XU

## 2017-11-20 PROCEDURE — 80053 COMPREHEN METABOLIC PANEL: CPT

## 2017-11-20 PROCEDURE — 88304 TISSUE EXAM BY PATHOLOGIST: CPT

## 2017-11-20 PROCEDURE — 83735 ASSAY OF MAGNESIUM: CPT

## 2017-11-20 PROCEDURE — 86900 BLOOD TYPING SEROLOGIC ABO: CPT

## 2017-11-20 PROCEDURE — 83605 ASSAY OF LACTIC ACID: CPT

## 2017-11-20 PROCEDURE — 74177 CT ABD & PELVIS W/CONTRAST: CPT

## 2017-11-20 PROCEDURE — 86901 BLOOD TYPING SEROLOGIC RH(D): CPT

## 2017-11-22 LAB — SURGICAL PATHOLOGY STUDY: SIGNIFICANT CHANGE UP

## 2017-11-27 DIAGNOSIS — K35.80 UNSPECIFIED ACUTE APPENDICITIS: ICD-10-CM

## 2017-11-27 DIAGNOSIS — K35.3 ACUTE APPENDICITIS WITH LOCALIZED PERITONITIS: ICD-10-CM

## 2019-10-31 NOTE — ED ADULT NURSE NOTE - CHIEF COMPLAINT QUOTE
Problem: Dysphagia (Adult) Goal: *Acute Goals and Plan of Care (Insert Text) Description Speech Pathology Initiated 10/28/19 1. Patient will tolerate regular diet/thin liquids without overt s/s of aspiration within 7 days 2. Patient will participate in [facility] for further objective assessment of swallow physiology within 7 days (once medically stable from Impella/cardiac sx standpoint) Outcome: Progressing Towards Goal 
 
Speech Path Chart reviewed; met with patient bedside. RN and patient report good tolerance of his breakfast today without any vomiting. Some nausea per patient. Reviewed SLP plan to proceed with MBS once medically stable (hopefully once some lines discontinued) and clearance from cardiac sx. It would be beneficial to further objectively assess swallow function in light of VC paresis. D/w RN. Will continue to follow with you. Please order MBS once medically stable for transport to radiology for study. Helena Robles MS, CCC-SLP, BCS-S 
 
  
 c/o abd pain and nausea since 0400

## 2022-11-29 NOTE — ED PROVIDER NOTE - RADIATION
no radiation Tissue Cultured Epidermal Autograft Text: The defect edges were debeveled with a #15 scalpel blade.  Given the location of the defect, shape of the defect and the proximity to free margins a tissue cultured epidermal autograft was deemed most appropriate.  The graft was then trimmed to fit the size of the defect.  The graft was then placed in the primary defect and oriented appropriately.

## 2023-01-11 NOTE — PATIENT PROFILE ADULT. - PAIN SCALE PREFERRED, PROFILE
Patient called we tried to to schedule for us of pelvis and she said she will call back because she was at work and could not do it at this time.   numerical 0-10

## 2023-05-11 NOTE — ED PROVIDER NOTE - NS ED ATTENDING STATEMENT MOD
[FreeTextEntry1] : \par  I have personally performed a face to face diagnostic evaluation on this patient. I have reviewed the ACP note and agree with the history, exam and plan of care, except as noted.

## 2025-03-22 NOTE — ED PROVIDER NOTE - OBJECTIVE STATEMENT
Pt arrives via EMS from home with c/o weakness starting today. Recent pace maker placed 10 days ago for Afib. Pt states that she feels short of breath at rest and was encouraged to come in by family for blood pressure check as well. GCS 15   26 y/o Male presents to the ED c/o RLQ and RUQ pain since this morning 400AM. Pain woke patient from his sleep, had associated nausea, vomiting, and possibly a subjective fever. Denies diarrhea, chest pain, and hx of abdominal surgery. Last bowel movement was 1 hour prior to arrival. 26 y/o Male presents to the ED c/o RLQ and RUQ pain since this morning 4:00AM. Pain woke patient from his sleep, had associated nausea, vomiting, and possibly a fever. Denies diarrhea, chest pain/dyspnea/dysuria or constipation. No hx of abdominal surgery.